# Patient Record
(demographics unavailable — no encounter records)

---

## 2025-05-02 NOTE — HISTORY OF PRESENT ILLNESS
[FreeTextEntry1] : NPV acne [de-identified] : Mr. Jose Gibbs is a 24-year-old male who presents for:    1) Acne, face - Duration: years - Symptoms: scarring - Prior tx: salicylic acid wash   Derm Hx: none; no personal hx of skin cancer Family Hx: no family hx of skin cancer

## 2025-05-02 NOTE — HISTORY OF PRESENT ILLNESS
[FreeTextEntry1] : NPV acne [de-identified] : Mr. Jose Gibbs is a 24-year-old male who presents for:    1) Acne, face - Duration: years - Symptoms: scarring - Prior tx: salicylic acid wash   Derm Hx: none; no personal hx of skin cancer Family Hx: no family hx of skin cancer

## 2025-05-02 NOTE — ASSESSMENT
[FreeTextEntry1] : #Acne vulgaris, face, moderate, comedonal and inflammatory, w/#Acne scarring, chronic - Reviewed risks (as well as mitigation strategies for adverse drug events as applicable), benefits, and alternatives of therapy. Patient prefers topicals  - Discussed holding off on cosmetic treatment of scarring until acne better controlled - START OTC benzoyl peroxide 4% wash to affected areas once daily - Discussed side effects including skin dryness, bleaching towels and skin - START tretinoin 0.025% cream to face 3 times per week at night and then increase to nightly as tolerated - discussed side effect of irritation and educated pt on importance of using facial moisturizer concomitantly - START clindamycin phosphate 1% lotion to affected areas of acne twice daily - SED  RTC 3 months

## 2025-05-02 NOTE — PHYSICAL EXAM
[Alert] : alert [Oriented x 3] : ~L oriented x 3 [Declined] : declined [FreeTextEntry3] : Focused exam only (see below) per patient request: - Multiple inflammatory papules, closed comedones and areas of icepick scarring on bilateral cheeks and forehead